# Patient Record
Sex: MALE | Race: BLACK OR AFRICAN AMERICAN | NOT HISPANIC OR LATINO | Employment: UNEMPLOYED | URBAN - METROPOLITAN AREA
[De-identification: names, ages, dates, MRNs, and addresses within clinical notes are randomized per-mention and may not be internally consistent; named-entity substitution may affect disease eponyms.]

---

## 2017-03-31 ENCOUNTER — HOSPITAL ENCOUNTER (EMERGENCY)
Facility: HOSPITAL | Age: 27
Discharge: HOME | End: 2017-03-31
Attending: EMERGENCY MEDICINE
Payer: MEDICAID

## 2017-03-31 VITALS
WEIGHT: 180 LBS | TEMPERATURE: 98 F | DIASTOLIC BLOOD PRESSURE: 65 MMHG | OXYGEN SATURATION: 99 % | SYSTOLIC BLOOD PRESSURE: 125 MMHG | RESPIRATION RATE: 14 BRPM | HEIGHT: 69 IN | BODY MASS INDEX: 26.66 KG/M2 | HEART RATE: 80 BPM

## 2017-03-31 DIAGNOSIS — T40.1X1A HEROIN OVERDOSE, ACCIDENTAL OR UNINTENTIONAL, INITIAL ENCOUNTER: Primary | ICD-10-CM

## 2017-03-31 PROCEDURE — 99284 EMERGENCY DEPT VISIT MOD MDM: CPT | Mod: 25

## 2017-03-31 PROCEDURE — 96374 THER/PROPH/DIAG INJ IV PUSH: CPT

## 2017-03-31 PROCEDURE — 63600175 PHARM REV CODE 636 W HCPCS: Performed by: EMERGENCY MEDICINE

## 2017-03-31 RX ORDER — NALOXONE HCL 0.4 MG/ML
0.4 VIAL (ML) INJECTION
Status: COMPLETED | OUTPATIENT
Start: 2017-03-31 | End: 2017-03-31

## 2017-03-31 RX ADMIN — NALOXONE HYDROCHLORIDE 0.4 MG: 0.4 INJECTION, SOLUTION INTRAMUSCULAR; INTRAVENOUS; SUBCUTANEOUS at 07:03

## 2017-03-31 NOTE — ED AVS SNAPSHOT
[facility]  [address] [place] [address]               Jacek Cook   3/31/2017  6:57 PM   ED    Description:  Male : 1990   Department:  [facility]           Your Care was Coordinated By:     Provider Role From To    Ty Holm MD Attending Provider [id number] --      Reason for Visit     Drug Overdose           Diagnoses this Visit        Comments    Heroin overdose, accidental or unintentional, initial encounter    -  Primary       ED Disposition     None           To Do List           Follow-up Information     Follow up with Sd Mendez MD In 2 days.    Specialty:  Family Medicine    Contact information:    [address]  [facility] [address]  [phone]        [facility]     [facility] -  Assistance  Unless otherwise directed by your provider, please contact [facility], our nurse care line that is available for  assistance.     Registered nurses in the [facility] provide: appointment scheduling, clinical advisement, health education, and other advisory services.  Call: [phone] (toll free)               Medications           Message regarding Medications     Verify the changes and/or additions to your medication regime listed below are the same as discussed with your clinician today.  If any of these changes or additions are incorrect, please notify your healthcare provider.        These medications were administered today        Dose Freq    naloxone 0.4 mg/mL injection 0.4 mg 0.4 mg ED 1 Time    Sig: Inject 1 mL (0.4 mg total) into the vein ED 1 Time.    Class: Normal    Route: Intravenous           Verify that the below list of medications is an accurate representation of the medications you are currently taking.  If none reported, the list may be blank. If incorrect, please contact your healthcare provider. Carry this list  "with you in case of emergency.           Current Medications     clonazePAM (KLONOPIN) 2 MG Tab Take 2 mg by mouth 2 (two) times daily.    dextroamphetamine-amphetamine (ADDERALL) 12.5 MG tablet Take 30 mg by mouth 2 (two) times daily.           Clinical Reference Information           Your Vitals Were     BP Pulse Temp Resp Height Weight    125/70 72 98 °F (36.7 °C) 14 5' 9" (1.753 m) 81.6 kg (180 lb)    SpO2 BMI             94% 26.58 kg/m2         Allergies as of 3/31/2017     No Known Allergies      Immunizations Administered on Date of Encounter - 3/31/2017     None      ED Micro, Lab, POCT     None      ED Imaging Orders     None        Discharge Instructions       Stop using heroin.  Please return immediately if you get worse or if new problems develop.  Rest.    [facility] Sign-Up     Activating your [facility] account is as easy as 1-2-3!     1) Visit [email or web address], select Sign Up Now, enter this activation code and your date of birth, then select Next.  [email or web address]  Expires: 5/15/2017 10:19 PM      2) Create a username and password to use when you visit [facility] in the future and select a security question in case you lose your password and select Next.    3) Enter your e-mail address and click Sign Up!    Additional Information  If you have questions, please e-mail [email or web address] or call [phone] to talk to our [facility] staff. Remember, [facility] is NOT to be used for urgent needs. For medical emergencies, dial 911.         Smoking Cessation     If you would like to quit smoking:   You may be eligible for free services if you are a [place] resident and started smoking cigarettes before September 1, 1988.  Call the Smoking Cessation [employer] (SCT) toll free at ([phone]) 206-4351 or (554) 226-2459.   Call 800-QUIT-NOW if you do not meet the above criteria.   Contact us via email: [email or web address]   View our website for more information: [email or web address]         [facility] complies with applicable Federal civil rights laws and does not discriminate on the basis of race, color, national origin, age, disability, or sex.        Language Assistance Services     ATTENTION: Language assistance services are available, free of charge. Please call [phone].      ATENCIÓN: Si habla español, tiene a mcnair disposición servicios gratuitos de asistencia lingüística. Llame al [phone].     CHÚ Ý: N?u b?n nói Ti?ng Vi?t, có các d?ch v? h? tr? ngôn ng? mi?n phí dành cho b?n. G?i s? [phone].

## 2017-04-01 NOTE — ED NOTES
"While being discharged pt asks for meal tray. Pt informed that meal trays are for pts and he is discharged. Pt holds shirt up and states "Tania Sparrow throw this at your motha-fucking head".   "

## 2017-04-01 NOTE — ED NOTES
"Pt given meal tray. Pt states "Can I get a cup of ice?" "I need pain medicine "Can I get a blanket?". "Yeah I'ma make you work".   "

## 2017-04-01 NOTE — ED NOTES
"Pt informed that he is discharged and that he needs a ride.Pt asked if we should call his listed family member for a ride. Pt states "yeah call them.  "

## 2017-04-01 NOTE — ED NOTES
Pt given phone and instructed to remain in bed. Pt told that the doctor must spoken to before any water or food.

## 2017-04-01 NOTE — ED TRIAGE NOTES
"Pt presents to ER due to heroin overdose as stated by EMS. EMS states pt breathing but lethargic upon their arrivaland 0.5 mg Narcan was given. Pt currently awake and alert. Pt keeps repeating "I'm hungry can I get food". Pt informed that he is at risk for aspiration due to his lethargy.  "

## 2017-04-01 NOTE — ED PROVIDER NOTES
"Encounter Date: 3/31/2017    SCRIBE #1 NOTE: I, Sri Ann, am scribing for, and in the presence of,  Ty Holm MD. I have scribed the following portions of the note - Other sections scribed: HPI and ROS.       History     Chief Complaint   Patient presents with    Drug Overdose     EMS states roommate called after pt shot up heroin.  EMS states pt breathing but lethargic upon their arrival.  EMS gave 0.5 mg Narcan.  Pt awake and alert.  Reports being hungry.      Review of patient's allergies indicates:  No Known Allergies  HPI Comments: CC: Drug Overdose    HPI: This 26 y.o. male with medical history of anxiety, depression and suicidal ideations presents to the ED via EMS transportation c/o a drug overdose that occurred today. EMS reports that they were called by pt's roommate after pt "shot up heroin" in attempts to "enjoy his Friday night". EMS reports that pt was responsive, but lethargic upon arrival. EMS notes giving pt 0.5 mg Narcan with improvement. Pt history limited secondary to pt's condition.       The history is provided by the EMS personnel. No  was used.     Past Medical History:   Diagnosis Date    Anxiety     Depression     Suicidal ideations      History reviewed. No pertinent surgical history.  History reviewed. No pertinent family history.  Social History   Substance Use Topics    Smoking status: Current Every Day Smoker     Packs/day: 0.50     Types: Cigarettes    Smokeless tobacco: None    Alcohol use No     Review of Systems   Unable to perform ROS: Other   Constitutional:        (+) drug overdose (heroin)       Physical Exam   Initial Vitals   BP Pulse Resp Temp SpO2   03/31/17 1900 03/31/17 1900 03/31/17 1900 03/31/17 1900 03/31/17 1900   120/66 60 18 98.8 °F (37.1 °C) 100 %     Physical Exam  The patient was examined specifically for the following:   General:No significant distress, Good color, Warm and dry. Head and neck:Scalp atraumatic, Neck supple. " Neurological:Appropriate conversation, Gross motor deficits. Eyes:Conjugate gaze, Clear corneas. ENT: No epistaxis. Cardiac: Regular rate and rhythm, Grossly normal heart tones. Pulmonary: Wheezing, Rales. Gastrointestinal: Abdominal tenderness, Abdominal distention. Musculoskeletal: Extremity deformity, Apparent pain with range of motion of the joints. Skin: Rash.   The findings on examination were normal except for the following: The patient is moving all 4 extremities.  He is poorly responsive.  He has a good respiratory effort.  Vital signs are stable.  The abdomen is soft.  Heart tones are normal.  The patient has regular rate and rhythm.  ED Course   Procedures  Labs Reviewed - No data to display       Medical decision making: Given the above this patient used heroin this afternoon.  He required treatment with Narcan once in the ambulance and once in the emergency room.  At 10:15 the patient still looks sleepy but he will wake up and talk.  I believe that he is over the deep sedation from his heroin overdose.  I will discharge and outpatient evaluation and treatment.  The patient is conversational at this time and can tell me that he can get someone to come see him.  He ate dinner in the emergency room.                  Scribe Attestation:   Scribe #1: I performed the above scribed service and the documentation accurately describes the services I performed. I attest to the accuracy of the note.    Attending Attestation:           Physician Attestation for Scribe:  Physician Attestation Statement for Scribe #1: I, Ty Holm MD, reviewed documentation, as scribed by Sri Ann in my presence, and it is both accurate and complete.                 ED Course     Clinical Impression:   The encounter diagnosis was Heroin overdose, accidental or unintentional, initial encounter.          Ty Holm MD  04/02/17 2036

## 2020-12-17 PROBLEM — S52.202A CLOSED FRACTURE OF SHAFT OF LEFT ULNA: Status: ACTIVE | Noted: 2020-12-17

## 2020-12-17 PROBLEM — S62.324A: Status: ACTIVE | Noted: 2020-12-17

## 2020-12-18 PROBLEM — M79.641 RIGHT HAND PAIN: Status: ACTIVE | Noted: 2020-12-17
